# Patient Record
Sex: MALE | Race: WHITE | NOT HISPANIC OR LATINO | Employment: FULL TIME | URBAN - METROPOLITAN AREA
[De-identification: names, ages, dates, MRNs, and addresses within clinical notes are randomized per-mention and may not be internally consistent; named-entity substitution may affect disease eponyms.]

---

## 2018-09-11 ENCOUNTER — OFFICE VISIT (OUTPATIENT)
Dept: URGENT CARE | Facility: CLINIC | Age: 28
End: 2018-09-11

## 2018-09-11 VITALS
OXYGEN SATURATION: 98 % | TEMPERATURE: 97 F | SYSTOLIC BLOOD PRESSURE: 127 MMHG | BODY MASS INDEX: 22.22 KG/M2 | WEIGHT: 150 LBS | DIASTOLIC BLOOD PRESSURE: 78 MMHG | HEART RATE: 99 BPM | HEIGHT: 69 IN | RESPIRATION RATE: 16 BRPM

## 2018-09-11 DIAGNOSIS — H10.32 ACUTE BACTERIAL CONJUNCTIVITIS OF LEFT EYE: Primary | ICD-10-CM

## 2018-09-11 PROCEDURE — 99213 OFFICE O/P EST LOW 20 MIN: CPT | Mod: S$GLB,,, | Performed by: NURSE PRACTITIONER

## 2018-09-11 RX ORDER — GENTAMICIN SULFATE 3 MG/ML
SOLUTION/ DROPS OPHTHALMIC
Qty: 15 ML | Refills: 0 | Status: SHIPPED | OUTPATIENT
Start: 2018-09-11

## 2018-09-11 NOTE — PROGRESS NOTES
"Subjective:       Patient ID: Jaxon Monson is a 28 y.o. male.    Vitals:  height is 5' 9" (1.753 m) and weight is 68 kg (150 lb). His oral temperature is 97.1 °F (36.2 °C). His blood pressure is 127/78 and his pulse is 99. His respiration is 16 and oxygen saturation is 98%.     Chief Complaint: Eye Pain    29 y/o male new to me presents with c/o dealing with pipe, getting metal shavings and dust in eye. Didn't feel any immediate problems for 2 days. Started on day 3 with pink eye, discharge, and redness.       Eye Pain    The right eye is affected. This is a new problem. The current episode started in the past 7 days (1 Week). The problem occurs constantly. The problem has been gradually improving. The injury mechanism was a foreign body. The pain is at a severity of 0/10. The patient is experiencing no pain. There is no known exposure to pink eye. He does not wear contacts. Associated symptoms include an eye discharge, eye redness, a foreign body sensation and itching. Pertinent negatives include no blurred vision, fever, nausea, photophobia, vomiting or weakness. He has tried eye drops for the symptoms. The treatment provided mild relief.     Review of Systems   Constitution: Negative for chills, diaphoresis, fever, weakness, malaise/fatigue and night sweats.   HENT: Negative for congestion.    Eyes: Positive for discharge, itching, pain and redness. Negative for blurred vision, photophobia, vision loss in left eye, vision loss in right eye, visual disturbance and visual halos.   Gastrointestinal: Negative for nausea and vomiting.   Neurological: Negative for headaches.       Objective:      Physical Exam   Constitutional: He is oriented to person, place, and time. He appears well-developed and well-nourished.   HENT:   Head: Normocephalic and atraumatic.   Eyes: Conjunctivae and EOM are normal. Pupils are equal, round, and reactive to light. Right eye exhibits no discharge. Left eye exhibits discharge. No " scleral icterus.   Left eye with no uptake upon fluorescein exam, conjunctiva and sclera erythematous.    Cardiovascular: Normal rate, regular rhythm and normal heart sounds.   Pulmonary/Chest: Effort normal and breath sounds normal.   Abdominal: Soft. Bowel sounds are normal.   Musculoskeletal: He exhibits no edema.   Neurological: He is alert and oriented to person, place, and time.   Skin: Skin is warm and dry. No pallor.   Psychiatric: He has a normal mood and affect. His behavior is normal. Judgment and thought content normal.   Nursing note and vitals reviewed.      Assessment:       1. Acute bacterial conjunctivitis of left eye        Plan:         1. Acute bacterial conjunctivitis of left eye  D/W pt good hand hygiene. Will treat both eyes secondary to risk of other eye getting infected. Educated that pt is contagious and will be until approximately 24 hours after starting eye drops.     - gentamicin (GARAMYCIN) 0.3 % ophthalmic solution; Apply 2 gtts QID to affected eye  Dispense: 15 mL; Refill: 0

## 2018-09-11 NOTE — PATIENT INSTRUCTIONS
D/W pt good hand hygiene. Will treat both eyes secondary to risk of other eye getting infected. Educated that pt is contagious and will be until approximately 24 hours after starting eye drops.

## 2018-09-14 ENCOUNTER — TELEPHONE (OUTPATIENT)
Dept: URGENT CARE | Facility: CLINIC | Age: 28
End: 2018-09-14